# Patient Record
Sex: MALE | Race: WHITE | NOT HISPANIC OR LATINO | URBAN - METROPOLITAN AREA
[De-identification: names, ages, dates, MRNs, and addresses within clinical notes are randomized per-mention and may not be internally consistent; named-entity substitution may affect disease eponyms.]

---

## 2022-06-04 ENCOUNTER — TELEPHONE ENCOUNTER (OUTPATIENT)
Dept: URBAN - METROPOLITAN AREA CLINIC 68 | Facility: CLINIC | Age: 67
End: 2022-06-04

## 2022-06-04 RX ORDER — SODIUM SULFATE, POTASSIUM SULFATE, MAGNESIUM SULFATE 17.5; 3.13; 1.6 G/ML; G/ML; G/ML
SOLUTION, CONCENTRATE ORAL AS DIRECTED
Qty: 1 | Refills: 0 | OUTPATIENT
Start: 2017-10-26 | End: 2017-10-27

## 2022-06-04 RX ORDER — URSODIOL 300 MG/1
URSODIOL( 300MG ORAL  TWO TIMES DAILY ) INACTIVE -HX ENTRY CAPSULE ORAL
OUTPATIENT
Start: 2017-10-26

## 2022-06-05 ENCOUNTER — TELEPHONE ENCOUNTER (OUTPATIENT)
Dept: URBAN - METROPOLITAN AREA CLINIC 68 | Facility: CLINIC | Age: 67
End: 2022-06-05

## 2022-06-05 RX ORDER — CYCLOSPORINE 25 MG/1
NEORAL( 25MG ORAL 2 CAPSULES TWO TIMES DAILY ) ACTIVE -HX ENTRY CAPSULE, LIQUID FILLED ORAL
Status: ACTIVE | COMMUNITY
Start: 2017-10-26

## 2022-06-05 RX ORDER — MYCOPHENOLATE MOFETIL 250 MG/1
CELLCEPT( 250MG ORAL  TWO TIMES DAILY ) ACTIVE -HX ENTRY CAPSULE ORAL
Status: ACTIVE | COMMUNITY
Start: 2017-10-26

## 2022-06-25 ENCOUNTER — TELEPHONE ENCOUNTER (OUTPATIENT)
Age: 67
End: 2022-06-25

## 2022-06-25 RX ORDER — URSODIOL 300 MG/1
URSODIOL( 300MG ORAL  TWO TIMES DAILY ) INACTIVE -HX ENTRY CAPSULE ORAL
OUTPATIENT
Start: 2017-10-26

## 2022-06-25 RX ORDER — SODIUM SULFATE, POTASSIUM SULFATE, MAGNESIUM SULFATE 17.5; 3.13; 1.6 G/ML; G/ML; G/ML
SOLUTION, CONCENTRATE ORAL AS DIRECTED
Qty: 1 | Refills: 0 | OUTPATIENT
Start: 2017-10-26 | End: 2017-10-27

## 2022-06-26 ENCOUNTER — TELEPHONE ENCOUNTER (OUTPATIENT)
Age: 67
End: 2022-06-26

## 2022-06-26 RX ORDER — MYCOPHENOLATE MOFETIL 250 MG/1
CELLCEPT( 250MG ORAL  TWO TIMES DAILY ) ACTIVE -HX ENTRY CAPSULE ORAL
Status: ACTIVE | COMMUNITY
Start: 2017-10-26

## 2022-06-26 RX ORDER — RAMIPRIL 2.5 MG/1
RAMIPRIL( 2.5MG ORAL  DAILY ) ACTIVE -HX ENTRY CAPSULE ORAL DAILY
Status: ACTIVE | COMMUNITY
Start: 2017-10-26

## 2022-06-26 RX ORDER — CYCLOSPORINE 25 MG/1
NEORAL( 25MG ORAL 2 CAPSULES TWO TIMES DAILY ) ACTIVE -HX ENTRY CAPSULE, LIQUID FILLED ORAL
Status: ACTIVE | COMMUNITY
Start: 2017-10-26

## 2022-07-09 ENCOUNTER — TELEPHONE ENCOUNTER (OUTPATIENT)
Dept: URBAN - METROPOLITAN AREA CLINIC 121 | Facility: CLINIC | Age: 67
End: 2022-07-09

## 2022-07-10 ENCOUNTER — TELEPHONE ENCOUNTER (OUTPATIENT)
Dept: URBAN - METROPOLITAN AREA CLINIC 121 | Facility: CLINIC | Age: 67
End: 2022-07-10

## 2023-08-15 ENCOUNTER — OFFICE VISIT (OUTPATIENT)
Dept: URBAN - METROPOLITAN AREA CLINIC 66 | Facility: CLINIC | Age: 68
End: 2023-08-15

## 2023-09-26 ENCOUNTER — OFFICE VISIT (OUTPATIENT)
Dept: URBAN - METROPOLITAN AREA CLINIC 66 | Facility: CLINIC | Age: 68
End: 2023-09-26

## 2023-10-17 ENCOUNTER — LAB OUTSIDE AN ENCOUNTER (OUTPATIENT)
Dept: URBAN - METROPOLITAN AREA CLINIC 66 | Facility: CLINIC | Age: 68
End: 2023-10-17

## 2023-10-17 ENCOUNTER — OFFICE VISIT (OUTPATIENT)
Dept: URBAN - METROPOLITAN AREA CLINIC 66 | Facility: CLINIC | Age: 68
End: 2023-10-17
Payer: MEDICARE

## 2023-10-17 ENCOUNTER — DASHBOARD ENCOUNTERS (OUTPATIENT)
Age: 68
End: 2023-10-17

## 2023-10-17 VITALS — WEIGHT: 170 LBS | BODY MASS INDEX: 24.34 KG/M2 | HEIGHT: 70 IN | HEART RATE: 72 BPM | OXYGEN SATURATION: 96 %

## 2023-10-17 DIAGNOSIS — K57.30 DIVERTICULOSIS OF COLON: ICD-10-CM

## 2023-10-17 DIAGNOSIS — I85.00 ESOPHAGEAL VARICES WITHOUT BLEEDING, UNSPECIFIED ESOPHAGEAL VARICES TYPE: ICD-10-CM

## 2023-10-17 DIAGNOSIS — Z83.719 FH: COLON POLYPS: ICD-10-CM

## 2023-10-17 DIAGNOSIS — Z94.4 LIVER TRANSPLANT RECIPIENT: ICD-10-CM

## 2023-10-17 PROBLEM — 14223005: Status: ACTIVE | Noted: 2023-10-17

## 2023-10-17 PROCEDURE — 99204 OFFICE O/P NEW MOD 45 MIN: CPT | Performed by: INTERNAL MEDICINE

## 2023-10-17 RX ORDER — RIVAROXABAN 10 MG/1
TABLET, FILM COATED ORAL
Qty: 90 TABLET | Status: ACTIVE | COMMUNITY

## 2023-10-17 RX ORDER — POLYETHYLENE GLYCOL 3350, SODIUM CHLORIDE, SODIUM BICARBONATE, POTASSIUM CHLORIDE 420; 11.2; 5.72; 1.48 G/4L; G/4L; G/4L; G/4L
AS DIRECTED POWDER, FOR SOLUTION ORAL ONCE
Qty: 4000 | Refills: 0 | OUTPATIENT
Start: 2023-10-17 | End: 2023-10-18

## 2023-10-17 RX ORDER — MYCOPHENOLATE MOFETIL 250 MG/1
CELLCEPT( 250MG ORAL  TWO TIMES DAILY ) ACTIVE -HX ENTRY CAPSULE ORAL
Status: ACTIVE | COMMUNITY
Start: 2017-10-26

## 2023-10-17 RX ORDER — CYCLOSPORINE 25 MG/1
NEORAL( 25MG ORAL 2 CAPSULES TWO TIMES DAILY ) ACTIVE -HX ENTRY CAPSULE, LIQUID FILLED ORAL
Status: ACTIVE | COMMUNITY
Start: 2017-10-26

## 2023-10-17 RX ORDER — RAMIPRIL 2.5 MG/1
RAMIPRIL( 2.5MG ORAL  DAILY ) ACTIVE -HX ENTRY CAPSULE ORAL DAILY
Status: ACTIVE | COMMUNITY
Start: 2017-10-26

## 2023-10-17 NOTE — HPI-TODAY'S VISIT:
Patient for colon cancer screening and hx of liver transplant.  Referred liver transplant center also requested for Esophageal varices re-evaluation (post transplant)  Patient denies nausea, vomiting, abdominal pain, heartburn, diarrhea, melena, hematochezia, anemia and weight loss.

## 2023-11-06 ENCOUNTER — OFFICE VISIT (OUTPATIENT)
Dept: URBAN - METROPOLITAN AREA SURGERY CENTER 12 | Facility: SURGERY CENTER | Age: 68
End: 2023-11-06